# Patient Record
Sex: MALE | Race: WHITE
[De-identification: names, ages, dates, MRNs, and addresses within clinical notes are randomized per-mention and may not be internally consistent; named-entity substitution may affect disease eponyms.]

---

## 2018-03-21 ENCOUNTER — HOSPITAL ENCOUNTER (OUTPATIENT)
Dept: HOSPITAL 57 - BURULT | Age: 63
Discharge: HOME | End: 2018-03-21
Payer: COMMERCIAL

## 2018-03-21 DIAGNOSIS — I25.10: Primary | ICD-10-CM

## 2018-03-21 DIAGNOSIS — R60.0: ICD-10-CM

## 2018-03-21 PROCEDURE — 93970 EXTREMITY STUDY: CPT

## 2018-03-22 NOTE — ULT
BILATERAL LOWER EXTREMITY VENOUS ULTRASOUND

3/21/18

 

Color duplex doppler ultrasonography of the deep veins of each lower extremity was performed. There w
as no evidence of echogenic clot on either side. All deep veins were freely compressible from groin t
o ankle bilaterally. There were normal doppler responses to augmentation maneuvers. 

 

IMPRESSION: 

No evidence of DVT.

 

POS: HOME

## 2018-05-01 ENCOUNTER — HOSPITAL ENCOUNTER (OUTPATIENT)
Dept: HOSPITAL 92 - SCSMRI | Age: 63
Discharge: HOME | End: 2018-05-01
Attending: PSYCHIATRY & NEUROLOGY
Payer: COMMERCIAL

## 2018-05-01 DIAGNOSIS — M47.892: ICD-10-CM

## 2018-05-01 DIAGNOSIS — G56.03: Primary | ICD-10-CM

## 2018-05-01 PROCEDURE — 72141 MRI NECK SPINE W/O DYE: CPT

## 2018-05-01 NOTE — MRI
MRI OF CERVICAL SPINE WITHOUT CONTRAST

5/1/18

 

COMPARISON:  

4/26/15.

 

HISTORY: 

Chronic neck pain radiating from back to head. 

 

TECHNIQUE:  

Multiplanar and multisequence MRI imaging of the cervical spine is provided without contrast.

 

FINDINGS:  

There is scoliosis of the cervical spine, limiting detailed assessment. The study is also somewhat li
mited by motion artifact. The sagittal STIR imaging demonstrates no focal areas of osseous marrow alivia
ma. There is no significant anterolisthesis or retrolisthesis evident on this exam. 

 

There is moderate degenerative change at the atlantoaxial interspaces, slightly progressed since the 
prior exam.

 

C2-3: There is disc desiccation with bilateral facet and uncovertebral osteophyte formation, right gr
eater than left. No significant central canal stenosis. Mild bilateral neural foraminal stenosis. 

 

C3-4: Disc desiccation present with bilateral facet and uncovertebral osteophyte formation, left grea
ter than right. Mild central canal stenosis and right neural foraminal stenosis. Moderate left neural
 foraminal stenosis.

 

C4-5: There is disc desiccation and mild disc space narrowing. There is bilateral facet and uncoverte
bral osteophyte formation. There is no significant central canal stenosis. There is mild bilateral ne
ural foraminal stenosis. 

 

C5-6: There is disc space narrowing, disc desiccation, anterior osteophyte formation and disc osteoph
yte complex partially effacing the ventral thecal and leading to a mild degree of central canal steno
sis. There is bilateral facet and uncovertebral osteophyte formation with moderate right and severe l
eft neural foraminal stenosis. 

 

C6-7: Disc space narrowing, disc desiccation, anterior osteophyte formation and disc osteophyte compl
ex present. There is effacement of the ventral thecal sac with mild central canal stenosis. There is 
mild right and severe left neural foraminal stenosis.

 

C7-T1: Disc space narrowing and disc desiccation with bilateral facet hypertrophy. Mild bilateral bobbi
ral foraminal stenosis. No significant central canal stenosis. There is no focal area of abnormal sig
nal intensity identified within the cervical cord. Detailed assessment on axial imaging is limited by
 persistent patient motion artifact. 

 

IMPRESSION:  

Multilevel degenerative change within the cervical spine as detailed above. 

 

POS: [facility]

## 2018-06-09 ENCOUNTER — HOSPITAL ENCOUNTER (INPATIENT)
Dept: HOSPITAL 92 - ERS | Age: 63
LOS: 5 days | Discharge: HOME | DRG: 871 | End: 2018-06-14
Attending: INTERNAL MEDICINE | Admitting: INTERNAL MEDICINE
Payer: COMMERCIAL

## 2018-06-09 VITALS — BODY MASS INDEX: 37.5 KG/M2

## 2018-06-09 DIAGNOSIS — K80.20: ICD-10-CM

## 2018-06-09 DIAGNOSIS — A41.51: Primary | ICD-10-CM

## 2018-06-09 DIAGNOSIS — N18.9: ICD-10-CM

## 2018-06-09 DIAGNOSIS — E66.01: ICD-10-CM

## 2018-06-09 DIAGNOSIS — N12: ICD-10-CM

## 2018-06-09 DIAGNOSIS — G47.33: ICD-10-CM

## 2018-06-09 DIAGNOSIS — F43.10: ICD-10-CM

## 2018-06-09 DIAGNOSIS — I12.9: ICD-10-CM

## 2018-06-09 DIAGNOSIS — Z87.11: ICD-10-CM

## 2018-06-09 DIAGNOSIS — Z87.891: ICD-10-CM
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
## 2018-06-09 DIAGNOSIS — E27.40: ICD-10-CM

## 2018-06-09 DIAGNOSIS — F32.9: ICD-10-CM

## 2018-06-09 DIAGNOSIS — R65.21: ICD-10-CM

## 2018-06-09 DIAGNOSIS — Z96.651: ICD-10-CM

## 2018-06-09 DIAGNOSIS — N17.9: ICD-10-CM

## 2018-06-09 DIAGNOSIS — B96.20: ICD-10-CM
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
## 2018-06-09 DIAGNOSIS — J40: ICD-10-CM

## 2018-06-09 DIAGNOSIS — Z87.19: ICD-10-CM

## 2018-06-09 DIAGNOSIS — E86.0: ICD-10-CM

## 2018-06-09 DIAGNOSIS — R31.9: ICD-10-CM

## 2018-06-09 LAB
ALBUMIN SERPL BCG-MCNC: 3.5 G/DL (ref 3.4–4.8)
ALP SERPL-CCNC: 220 U/L (ref 40–150)
ALT SERPL W P-5'-P-CCNC: 30 U/L (ref 8–55)
ANION GAP SERPL CALC-SCNC: 18 MMOL/L (ref 10–20)
AST SERPL-CCNC: 73 U/L (ref 5–34)
BACTERIA UR QL AUTO: (no result) HPF
BILIRUB SERPL-MCNC: 1.9 MG/DL (ref 0.2–1.2)
BUN SERPL-MCNC: 42 MG/DL (ref 8.4–25.7)
CALCIUM SERPL-MCNC: 8.8 MG/DL (ref 7.8–10.44)
CHLORIDE SERPL-SCNC: 102 MMOL/L (ref 98–107)
CO2 SERPL-SCNC: 18 MMOL/L (ref 23–31)
CREAT CL PREDICTED SERPL C-G-VRATE: 0 ML/MIN (ref 70–130)
CRYSTAL-AUWI FLAG: 0 (ref 0–15)
GLOBULIN SER CALC-MCNC: 2.6 G/DL (ref 2.4–3.5)
GLUCOSE SERPL-MCNC: 111 MG/DL (ref 80–115)
HEV IGM SER QL: 0.8 (ref 0–7.99)
HGB BLD-MCNC: 12.4 G/DL (ref 14–18)
HYALINE CASTS #/AREA URNS LPF: (no result) LPF
LIPASE SERPL-CCNC: 52 U/L (ref 8–78)
MCH RBC QN AUTO: 30 PG (ref 27–31)
MCV RBC AUTO: 88 FL (ref 80–94)
MDIFF COMPLETE?: YES
PATHC CAST-AUWI FLAG: 0.14 (ref 0–2.49)
PLATELET # BLD AUTO: 154 THOU/UL (ref 130–400)
PLATELET BLD QL SMEAR: (no result)
POLYCHROMASIA BLD QL SMEAR: (no result) (100X)
POTASSIUM SERPL-SCNC: 3.6 MMOL/L (ref 3.5–5.1)
PROT UR STRIP.AUTO-MCNC: 100 MG/DL
RBC # BLD AUTO: 4.14 MILL/UL (ref 4.7–6.1)
RBC UR QL AUTO: (no result) HPF (ref 0–3)
REFLEX FOR REVIEW??: YES
SODIUM SERPL-SCNC: 134 MMOL/L (ref 136–145)
SP GR UR STRIP: 1.01 (ref 1–1.04)
SPERM-AUWI FLAG: 0 (ref 0–9.9)
WBC # BLD AUTO: 6.6 THOU/UL (ref 4.8–10.8)
YEAST-AUWI FLAG: 0 (ref 0–25)

## 2018-06-09 PROCEDURE — 85007 BL SMEAR W/DIFF WBC COUNT: CPT

## 2018-06-09 PROCEDURE — 76700 US EXAM ABDOM COMPLETE: CPT

## 2018-06-09 PROCEDURE — 74018 RADEX ABDOMEN 1 VIEW: CPT

## 2018-06-09 PROCEDURE — 87186 SC STD MICRODIL/AGAR DIL: CPT

## 2018-06-09 PROCEDURE — P9045 ALBUMIN (HUMAN), 5%, 250 ML: HCPCS

## 2018-06-09 PROCEDURE — 94640 AIRWAY INHALATION TREATMENT: CPT

## 2018-06-09 PROCEDURE — 96365 THER/PROPH/DIAG IV INF INIT: CPT

## 2018-06-09 PROCEDURE — 96367 TX/PROPH/DG ADDL SEQ IV INF: CPT

## 2018-06-09 PROCEDURE — 87040 BLOOD CULTURE FOR BACTERIA: CPT

## 2018-06-09 PROCEDURE — 80048 BASIC METABOLIC PNL TOTAL CA: CPT

## 2018-06-09 PROCEDURE — 94760 N-INVAS EAR/PLS OXIMETRY 1: CPT

## 2018-06-09 PROCEDURE — 87149 DNA/RNA DIRECT PROBE: CPT

## 2018-06-09 PROCEDURE — A9537 TC99M MEBROFENIN: HCPCS

## 2018-06-09 PROCEDURE — 87086 URINE CULTURE/COLONY COUNT: CPT

## 2018-06-09 PROCEDURE — 81015 MICROSCOPIC EXAM OF URINE: CPT

## 2018-06-09 PROCEDURE — 85027 COMPLETE CBC AUTOMATED: CPT

## 2018-06-09 PROCEDURE — 83605 ASSAY OF LACTIC ACID: CPT

## 2018-06-09 PROCEDURE — 80053 COMPREHEN METABOLIC PANEL: CPT

## 2018-06-09 PROCEDURE — 85060 BLOOD SMEAR INTERPRETATION: CPT

## 2018-06-09 PROCEDURE — 85025 COMPLETE CBC W/AUTO DIFF WBC: CPT

## 2018-06-09 PROCEDURE — 96360 HYDRATION IV INFUSION INIT: CPT

## 2018-06-09 PROCEDURE — 82533 TOTAL CORTISOL: CPT

## 2018-06-09 PROCEDURE — 80076 HEPATIC FUNCTION PANEL: CPT

## 2018-06-09 PROCEDURE — 81003 URINALYSIS AUTO W/O SCOPE: CPT

## 2018-06-09 PROCEDURE — 87077 CULTURE AEROBIC IDENTIFY: CPT

## 2018-06-09 PROCEDURE — 96368 THER/DIAG CONCURRENT INF: CPT

## 2018-06-09 PROCEDURE — 71045 X-RAY EXAM CHEST 1 VIEW: CPT

## 2018-06-09 PROCEDURE — C9113 INJ PANTOPRAZOLE SODIUM, VIA: HCPCS

## 2018-06-09 PROCEDURE — 71046 X-RAY EXAM CHEST 2 VIEWS: CPT

## 2018-06-09 PROCEDURE — 36556 INSERT NON-TUNNEL CV CATH: CPT

## 2018-06-09 PROCEDURE — 36415 COLL VENOUS BLD VENIPUNCTURE: CPT

## 2018-06-09 PROCEDURE — 96361 HYDRATE IV INFUSION ADD-ON: CPT

## 2018-06-09 PROCEDURE — 78227 HEPATOBIL SYST IMAGE W/DRUG: CPT

## 2018-06-09 PROCEDURE — 96366 THER/PROPH/DIAG IV INF ADDON: CPT

## 2018-06-09 PROCEDURE — 83690 ASSAY OF LIPASE: CPT

## 2018-06-09 PROCEDURE — A4216 STERILE WATER/SALINE, 10 ML: HCPCS

## 2018-06-09 NOTE — HP
PRIMARY CARE PHYSICIAN:  Dr. Gela Renteria in [place].

 

CHIEF COMPLAINT:  Nausea, vomiting, diarrhea, and feeling weak.

 

HISTORY OF PRESENT ILLNESS:  Mr. Madison is a pleasant 62-year-old gentleman who has a history of h
ypertension and peptic ulcer disease as well as posttraumatic stress disorder.  He was in his usual s
torres of health until this past Wednesday.  He says that he got overheated and started feeling very we
ak and the muscles were hurting him really bad, then he started vomiting and having diarrhea.  He say
s he tried to take it easy and drink as much fluids as he could, but he could not hold anything down 
including his medications, then by Friday it started getting worse.  He trying to drink water as well
 as Dr. Pepper thinking it might help, but he still got very weak and was having no energy.  During t
his time, he continued to vomit at least 3-4 times a day as well as have diarrhea, which he says is m
ore like a soft stool at least 3-4 times a day as well.  He did not notice any blood in the stool, bu
t on Wednesday, he said he noticed a lot of blood in his urine.  He says it was so much that it was e
bonnie getting on the toilet seat.  He also noted some burning with urination.  He always has back pain 
due to chronic back pain, so that had not changed.  He did notice some fever and chills on Wednesdays
 and Thursday, as well as on Saturday.  He does have nocturia at least 2-3 times a day and has noted 
a decrease in his urine stream.  Also, regarding the nausea and vomiting, he says he has not eaten an
ything different.  No one else has been sick in the house and prior to this current illness, he was n
ot having any excessive difficulty with eating.

 

REVIEW OF SYSTEMS:  Constitutional:  He has had subjective fever and chills, decreased oral intake an
d decrease in his appetite.  HEENT:  He has felt dizzy and some lightheadedness, but no visual change
s, no sore throat, rhinorrhea, neck pain, no adenopathy.  Pulmonary:  No hemoptysis, no cough, no whe
ezing.  Cardiovascular:  He denies any chest pain or shortness of breath, no PND, no orthopnea.  Cora
rointestinal:  As the history of present illness.  Genitourinary:  As the history of present illness.
  Musculoskeletal:  He does complain of some muscle aches and feeling weak earlier in the week.  Neur
ologic:  No focal weakness or numbness, no seizures.  Psychiatric:  No symptoms of anxiety or depress
ion.  Skin and Integument:  No skin changes.  No rash.

 

PAST MEDICAL HISTORY:  Significant for peptic ulcer disease and significant GI bleed secondary to colleen
t requiring transfusion.  Hypertension and posttraumatic stress disorder.

 

PAST SURGICAL HISTORY:  He has had spinal surgery, septic arthritis of the right knee.  He has also h
ad a total knee replacement on the right.  Multiple back surgeries as well as hip surgery.

 

SOCIAL HISTORY:  He is a former smoker.  He quit about 15 years ago.  He denies any alcohol use.  He 
is  and he is retired.

 

ALLERGIES:  PENICILLIN.

 

FAMILY HISTORY:  History of heart disease and cancer.

 

CURRENT MEDICATIONS:  Include fenofibrate 145 mg daily, lisinopril/hydrochlorothiazide 20/25 one tabl
et twice a day, metoprolol 50 mg daily, Protonix 40 mg a day, Viagra 100 mg daily, simvastatin 20 mg 
daily, Seroquel 50 mg daily, amlodipine 5 mg daily, Meloxicam 15 mg at bedtime.

 

PHYSICAL EXAMINATION:

VITAL SIGNS:  His initial blood pressure was 85/50, heart rate was 106, respiratory rate of 24, tempe
rature is 99.4.

GENERAL:  He is a well-developed and well-nourished.  He appears to be in some distress and he is unc
omfortable, but he says this is because of back pain which is chronic.

HEENT:  His pupils are equal, round, and reactive.  Extraocular muscles are intact.  Sclerae are anic
teric.  Throat:  There is no erythema, no exudates.

NECK:  No adenopathy, no bruits.

LUNGS:  Clear to auscultation.  There is no wheezing or rales.

CARDIOVASCULAR:  He has a normal S1, S2.  There is no S3 or S4.  No murmurs, clicks or rubs.

ABDOMEN:  Distended, it is obese, it is nontender.  There is no rebound or guarding.  Positive bowel 
sounds.

EXTREMITIES:  He has got trace pedal edema.  He has got palpable dorsalis pedis pulses.

NEUROLOGICALLY:  The exam is nonfocal.

 

LABORATORY DATA:  White blood cell count 6.6, hemoglobin 12.4, hematocrit is 36.5, platelet count was
 154.  He had 41 bands.  Sodium 134, potassium 3.6, chloride is 102, CO2 is 18, BUN of 42, creatinine
 2.4 and glucose is 111.  Lactic acid is 4.4.  His total bilirubin was 1.9, alkaline phosphatase was 
220.  He had an abdominal x-ray which was primarily for line placement.  Chest x-ray was essentially 
negative.

 

ASSESSMENT AND PLAN:  This is a pleasant 62-year-old gentleman who presents to the emergency room wit
h nausea, vomiting, and diarrhea, which is going on for several days.  He was hypotensive on presenta
tion and has a bandemia and low-grade temperature.  There are no obvious signs of infection; however,
 the patient does admit to some hematuria and the urinalysis has only just now been sent.  The urine 
color was tannish; however, it was clear.  It is also noted that he has an elevated total bilirubin a
s well as alkaline phosphatase, and he still has his gallbladder, therefore gallbladder pathology is 
a possibility in addition to potentially a urine source.  He also has an acute renal failure likely a
s a result of a combination of volume depletion as well as sepsis.  He will be placed in the ICU, sta
rted on fluid resuscitation, he is already had 4 liters in the emergency room.  This will be continue
d.  He will be placed on antibiotics to cover primarily a GI or urine source.  Urinalysis and urine c
ulture will be sent.  Given the acute renal failure, we will also get a renal ultrasound and we will 
get an abdominal ultrasound given the elevated alkaline phosphatase.  If he has a urinary tract infec
tion and his symptoms improve with treatment with IV antibiotics, then we will forego getting a GI co
nsult; however, if his nausea and vomiting persist, then a GI consultation will be entertained.  Give
n his history of peptic ulcer disease, we will place him on IV Protonix twice a day as well as IV ant
iemetics until his acute symptomatology can be sorted out.

## 2018-06-09 NOTE — RAD
CHEST 1 VIEW:

 

HISTORY: 

Nausea.  Abdominal and chest pain.

 

COMPARISON: 

9/2/17.

 

FINDINGS: 

Cardiac silhouette magnified by projection.  Pulmonary vasculature upper limits of normal.  Mediastin
um is midline.  Right hemidiaphragm remains lobulated.  No lobar consolidation or evidence of pneumot
horax.  Cardiac monitor leads overlie the chest.

 

IMPRESSION: 

Borderline pulmonary vascular congestion.

 

POS: [facility]

## 2018-06-09 NOTE — RAD
CHEST ONE VIEW:

6/9/18

 

HISTORY: 

Central line placement.

 

COMPARISON:  

Chest radiograph same day. 

 

FINDINGS:  

No central venous catheter is seen on this examination. Heart size is mildly enlarged. Mild worsening
 pulmonary venous congestion.

 

IMPRESSION:  

Mild worsening pulmonary venous congestion. No central venous catheter is seen. 

 

POS: [facility]

## 2018-06-09 NOTE — RAD
ABDOMEN ONE VIEW

6/9/18

 

HISTORY: 

Central line verification. 

 

COMPARISON:  

None.

 

FINDINGS:  

A central venous catheter is in place in the left femoral areas with the tip not well see, quite poss
ibly due to the SI joint fusion hardware. Lumbosacral fusion hardware is also present. 

 

IMPRESSION:  

Left femoral catheter tip not well seen. Followup recommended. 

 

POS: [facility]

## 2018-06-10 LAB
ALBUMIN SERPL BCG-MCNC: 3.5 G/DL (ref 3.4–4.8)
ALP SERPL-CCNC: 82 U/L (ref 40–150)
ALT SERPL W P-5'-P-CCNC: 51 U/L (ref 8–55)
ANION GAP SERPL CALC-SCNC: 15 MMOL/L (ref 10–20)
AST SERPL-CCNC: 110 U/L (ref 5–34)
BILIRUB DIRECT SERPL-MCNC: 1.2 MG/DL (ref 0.1–0.3)
BILIRUB SERPL-MCNC: 1.7 MG/DL (ref 0.2–1.2)
BUN SERPL-MCNC: 42 MG/DL (ref 8.4–25.7)
CALCIUM SERPL-MCNC: 8.5 MG/DL (ref 7.8–10.44)
CHLORIDE SERPL-SCNC: 102 MMOL/L (ref 98–107)
CO2 SERPL-SCNC: 20 MMOL/L (ref 23–31)
CREAT CL PREDICTED SERPL C-G-VRATE: 57 ML/MIN (ref 70–130)
GLUCOSE SERPL-MCNC: 103 MG/DL (ref 80–115)
HGB BLD-MCNC: 12 G/DL (ref 14–18)
MCH RBC QN AUTO: 29.9 PG (ref 27–31)
MCV RBC AUTO: 88.8 FL (ref 80–94)
MDIFF COMPLETE?: YES
PLATELET # BLD AUTO: 155 THOU/UL (ref 130–400)
PLATELET BLD QL SMEAR: (no result)
POTASSIUM SERPL-SCNC: 4.3 MMOL/L (ref 3.5–5.1)
RBC # BLD AUTO: 4.01 MILL/UL (ref 4.7–6.1)
SODIUM SERPL-SCNC: 133 MMOL/L (ref 136–145)
TOXIC GRANULES BLD QL SMEAR: SLIGHT
WBC # BLD AUTO: 20.9 THOU/UL (ref 4.8–10.8)

## 2018-06-10 PROCEDURE — 3E033XZ INTRODUCTION OF VASOPRESSOR INTO PERIPHERAL VEIN, PERCUTANEOUS APPROACH: ICD-10-PCS | Performed by: INTERNAL MEDICINE

## 2018-06-10 RX ADMIN — VASOPRESSIN SCH MLS: 20 INJECTION INTRAVENOUS at 01:27

## 2018-06-10 RX ADMIN — HYDROCORTISONE SODIUM SUCCINATE SCH MG: 100 INJECTION, POWDER, FOR SOLUTION INTRAMUSCULAR; INTRAVENOUS at 23:03

## 2018-06-10 RX ADMIN — HYDROCORTISONE SODIUM SUCCINATE SCH MG: 100 INJECTION, POWDER, FOR SOLUTION INTRAMUSCULAR; INTRAVENOUS at 11:43

## 2018-06-10 RX ADMIN — MEROPENEM AND SODIUM CHLORIDE SCH MLS: 1 INJECTION, SOLUTION INTRAVENOUS at 15:10

## 2018-06-10 RX ADMIN — MEROPENEM AND SODIUM CHLORIDE SCH MLS: 1 INJECTION, SOLUTION INTRAVENOUS at 23:06

## 2018-06-10 RX ADMIN — HYDROCORTISONE SODIUM SUCCINATE SCH MG: 100 INJECTION, POWDER, FOR SOLUTION INTRAMUSCULAR; INTRAVENOUS at 17:52

## 2018-06-10 RX ADMIN — Medication SCH ML: at 19:53

## 2018-06-10 RX ADMIN — Medication SCH MLS: at 09:23

## 2018-06-10 RX ADMIN — Medication SCH MLS: at 04:56

## 2018-06-10 RX ADMIN — Medication SCH MLS: at 00:28

## 2018-06-10 RX ADMIN — VASOPRESSIN SCH MLS: 20 INJECTION INTRAVENOUS at 17:53

## 2018-06-10 NOTE — CON
DATE OF CONSULTATION:  06/10/2018

 

HISTORY OF PRESENT ILLNESS:  José Luis Madison is a 62-year-old obese gentleman from [place], [place], 
who weighs 113 kilograms, presented to the ER on 06/09/2018 with hypotension, blood pressure 85/55, t
emperature 99, respiration 24 with a several day history of intermittent nausea, vomiting, diarrhea, 
back pain.  He said he became overheated on Wednesday, 3 days ago, profuse diarrhea, he has had blood
 in the urine.  Day before coming to the hospital, he did have severe back pain, weakness, dizziness,
 lightheaded for the hematuria and hypertension.

 

He normally sees a doctor in [place] and Dr. Renteria.

 

Since admission, he was started on 2 pressors, Levophed and vasopressin, hydrated.

 

He is feeling better this morning.  He is awake, alert, and responsive.

 

PAST MEDICAL HISTORY:  Pertinent for chronic pain; history of sleep apnea, though he has never been t
ested; history of hypertension; history of anxiety; history of traumatic stress syndrome apparently; 
former smoker, a pack a day for 30 years.

 

PAST SURGICAL HISTORY:  Otherwise included a T12-S1 lumbar fusion, rectal surgery, bilateral hip surg
sj, right knee surgery.

 

SOCIAL HISTORY:  Tobacco as noted, a pack a day for about 15 years, quit smoking 15 years ago.

 

LIST OF MEDICATIONS:  From home includes Tylenol No. 3, vitamins, Effexor 150 twice a day, Ranexa 500
 twice a day, lisinopril twice a day, Viagra 1 tablet as needed, Seroquel 50, Protonix 40, metoprolol
, Keppra 50, Zocor 20, meloxicam 15, Norvasc 5.

 

He is started on Levaquin antibiotic and pressors.

 

ALLERGIES:  PENICILLIN.

 

SOCIAL/FAMILY HISTORY:  Otherwise unremarkable.

 

REVIEW OF SYSTEMS:  Ten point negative.

 

PHYSICAL EXAMINATION:

VITAL SIGNS:  Blood pressure is 101/80, pulse rate is 80.

GENERAL:  Awake, alert, responsive.

CHEST:  Reveals no crackles, rubs or wheezing.

CARDIAC:  Normal S1, S2, no gallops.

ABDOMEN:  Soft.  No masses.

 

LABORATORY:  White count 20,000, H and H 12 and 25, platelet count 55.  He has got 41 bands.  Creatin
ine is 2.4, BUN is 42.  Lactic acid 4.6.  Urine shows too numerous to count rbcs and wbcs.  Ultrasoun
d of the abdomen shows gallbladder distention and thickening, possibly cholecystitis.

 

IMPRESSION:  Hypotension, sepsis, urinary tract infection, morbid obesity, sleep apnea, renal failure
, hypertension, chronic arthritis, depression.

 

PLAN:  A consult has been ordered.  Continue aggressive hydration.

 

I have added steroids and Rocephin for broader coverage.

 

We will follow.  Forty-five minutes critical care time.

## 2018-06-10 NOTE — ULT
SONOGRAM ABDOMEN COMPLETE:

 

HISTORY: 

Upper abdomen pain.  Abnormal liver function tests.

 

FINDINGS: 

Gallbladder discussed up to 9.0 cm.  No shadowing stones are apparent.  Gallbladder wall is somewhat 
thickened at 0.6 cm with minimal pericholecystic fluid.  

 

Liver is heterogeneous without intrahepatic biliary dilatation evident.  Common duct 0.6 cm.  No sign
ificant free fluid.  Small right renal cyst.  No hydronephrosis.  Tiny echogenic focus at the superio
r pole left kidney is nonspecific.  Urinary bladder decompressed.  Spleen unremarkable.  The aorta, I
VC, and pancreas are predominantly obscured.

 

IMPRESSION: 

Gallbladder distention and wall thickening can be seen with acute cholecystitis, although these are n
onspecific findings.  Clinical correlation regarding other signs and symptoms of acute cholecystitis 
is required.  No evidence of central biliary obstruction.  No focal hepatic abnormalities are apparen
t.

 

POS: [facility]

## 2018-06-10 NOTE — PDOC.PN
- Subjective


Encounter Start Date: 06/10/18


Encounter Start Time: 10:30


Subjective: pt up in bed eating





- Objective


Resuscitation Status: 


 











Resuscitation Status           FULL:Full Resuscitation














Vital Signs & Weight: 


 Vital Signs (12 hours)











  Temp Pulse Resp Pulse Ox


 


 06/10/18 12:00  98.1 F   


 


 06/10/18 08:52     98


 


 06/10/18 08:00  97.9 F  102 H  21 H  100








 Weight











Weight                         262 lb 2.074 oz











 Most Recent Monitor Data











Heart Rate from ECG            80


 


NIBP                           113/74


 


NIBP BP-Mean                   86


 


Respiration from ECG           24


 


SpO2                           100














I&O: 


 











 06/09/18 06/10/18 06/11/18





 06:59 06:59 06:59


 


Intake Total  1339.5 1080


 


Output Total  3675 1930


 


Balance  -2335.5 -850











Result Diagrams: 


 06/10/18 04:45





 06/10/18 04:45





Phys Exam





- Physical Examination


HEENT: PERRLA, moist MMs, sclera anicteric, TM's clear, oral pharynx no lesions

, 2+ tonsils


Neck: no nodes, no JVD, supple, full ROM


Respiratory: no wheezing, no rales, no rhonchi, wheezing present, clear to 

auscultation bilateral


Cardiovascular: RRR, no significant murmur, no rub, gallop, irregular


Gastrointestinal: soft, non-tender, no distention, positive bowel sounds


Musculoskeletal: no edema, pulses present, edema present





Dx/Plan


(1) Sepsis


Code(s): A41.9 - SEPSIS, UNSPECIFIED ORGANISM   Status: Acute   





(2) UTI (urinary tract infection)


Status: Acute   





(3) Leukocytosis


Code(s): D72.829 - ELEVATED WHITE BLOOD CELL COUNT, UNSPECIFIED   Status: Acute

   





(4) Acute kidney injury


Code(s): N17.9 - ACUTE KIDNEY FAILURE, UNSPECIFIED   Status: Acute   Comment: 

resolving   





- Plan





* .


pt is still on 2 pressors but are being weaned down. will change abx to 

meropenam since he is still on pressors but clinically appears well. His RUQ 

ultrasound indicated gall bladder enlargement and thickness. may get HIDA scan. 





Review of Systems





- Review of Systems


ENT: negative: Ear Pain, Ear Discharge, Nose Pain, Nose Discharge, Nose 

Congestion, Mouth Pain, Mouth Swelling, Throat Pain, Throat Swelling, Other


Respiratory: negative: Cough, Dry, Shortness of Breath, Hemoptysis, SOB with 

Excertion, Pleuritic Pain, Sputum, Wheezing


Cardiovascular: negative: chest pain, palpitations, orthopnea, paroxysmal 

nocturnal dyspnea, edema, light headedness, other


Gastrointestinal: negative: Nausea, Vomiting, Abdominal Pain, Diarrhea, 

Constipation, Melena, Hematochezia, Other


Genitourinary: negative: Dysuria, Frequency, Incontinence, Hematuria, Retention

, Other





- Medications/Allergies


Allergies/Adverse Reactions: 


 Allergies











Allergy/AdvReac Type Severity Reaction Status Date / Time


 


Penicillins Allergy Severe chest Verified 09/02/17 18:49





   cramps,  





   "massive"  











Medications: 


 Current Medications





Acetaminophen (Tylenol)  650 mg PO Q4H PRN


   PRN Reason: Headache/Fever or Pain


   Last Admin: 06/10/18 14:17 Dose:  650 mg


Acetaminophen/Codeine Phosphate (Tylenol #3)  1 tab PO TID PRN


   PRN Reason: Pain


Albuterol/Ipratropium (Duoneb)  3 ml NEB Q4H PRN


   PRN Reason: .SOB


   Last Admin: 06/09/18 20:16 Dose:  3 ml


Atorvastatin Calcium (Lipitor)  10 mg PO HS [facility]


   Last Admin: 06/09/18 22:30 Dose:  10 mg


Fenofibrate (Tricor)  145 mg PO QPM [facility]


   Last Admin: 06/09/18 22:29 Dose:  145 mg


Ferrous Sulfate (Feosol)  325 mg PO HS [facility]


   Last Admin: 06/09/18 22:30 Dose:  325 mg


Hydrocortisone Sodium Succinate (Solu-Cortef)  50 mg IVP Q6HR [facility]


   Stop: 06/17/18 12:01


   Last Admin: 06/10/18 11:43 Dose:  50 mg


Norepinephrine Bitartrate (Levophed)  250 mls @ 0 mls/hr IVPB INF [facility]; Titrate


   PRN Reason: Protocol


   Last Admin: 06/10/18 09:23 Dose:  250 mls


Sodium Chloride (Normal Saline 0.9%)  1,000 mls @ 50 mls/hr IV .Q20H [facility]


   Last Admin: 06/10/18 14:17 Dose:  1,000 mls


Vasopressin 40 unit/Miscellaneous Medication 1 each/ Sodium Chloride  102 mls @ 

0 mls/hr IV INF [facility]SEBIO; As Directed


   PRN Reason: Protocol


   Last Admin: 06/10/18 01:27 Dose:  102 mls


Meropenem 1 gm/ Device  50 mls @ 100 mls/hr IVPB Q8H [facility]


   Last Admin: 06/10/18 15:10 Dose:  50 mls


Ondansetron HCl (Zofran Odt)  4 mg PO Q6H PRN


   PRN Reason: Nausea/Vomiting


   Last Admin: 06/09/18 20:17 Dose:  4 mg


Ondansetron HCl (Zofran)  4 mg IVP Q6H PRN


   PRN Reason: Nausea/Vomiting


Pantoprazole Sodium (Protonix)  40 mg IVP Q12HR [facility]


   Last Admin: 06/10/18 08:05 Dose:  Not Given


Quetiapine Fumarate (Seroquel)  50 mg PO HS [facility]


   Last Admin: 06/09/18 22:31 Dose:  50 mg


Ranolazine (Ranexa)  500 mg PO BID [facility]


   Last Admin: 06/10/18 08:04 Dose:  500 mg


Sodium Chloride (Flush - Normal Saline)  10 ml IVF Q12HR [facility]


Sodium Chloride (Flush - Normal Saline)  10 ml IVF PRN PRN


   PRN Reason: Saline Flush


Tramadol HCl (Ultram)  50 mg PO Q4H PRN


   PRN Reason: Moderate Pain (4-6)


   Last Admin: 06/10/18 14:17 Dose:  50 mg


Venlafaxine HCl (Effexor Xr)  150 mg PO 0600,1200 [facility]


   Last Admin: 06/10/18 11:44 Dose:  150 mg

## 2018-06-11 RX ADMIN — HYDROCORTISONE SODIUM SUCCINATE SCH MG: 100 INJECTION, POWDER, FOR SOLUTION INTRAMUSCULAR; INTRAVENOUS at 20:47

## 2018-06-11 RX ADMIN — HYDROCORTISONE SODIUM SUCCINATE SCH MG: 100 INJECTION, POWDER, FOR SOLUTION INTRAMUSCULAR; INTRAVENOUS at 17:42

## 2018-06-11 RX ADMIN — Medication SCH ML: at 08:52

## 2018-06-11 RX ADMIN — HYDROCORTISONE SODIUM SUCCINATE SCH MG: 100 INJECTION, POWDER, FOR SOLUTION INTRAMUSCULAR; INTRAVENOUS at 13:03

## 2018-06-11 RX ADMIN — MEROPENEM AND SODIUM CHLORIDE SCH MLS: 1 INJECTION, SOLUTION INTRAVENOUS at 05:55

## 2018-06-11 RX ADMIN — CEFTRIAXONE SCH MLS: 2 INJECTION, POWDER, FOR SOLUTION INTRAMUSCULAR; INTRAVENOUS at 09:30

## 2018-06-11 RX ADMIN — HYDROCORTISONE SODIUM SUCCINATE SCH MG: 100 INJECTION, POWDER, FOR SOLUTION INTRAMUSCULAR; INTRAVENOUS at 05:04

## 2018-06-11 RX ADMIN — Medication SCH ML: at 20:48

## 2018-06-11 NOTE — PDOC.PN
- Subjective


Encounter Start Date: 06/11/18


Encounter Start Time: 14:00


Subjective: pt up in bed no complains





- Objective


Resuscitation Status: 


 











Resuscitation Status           FULL:Full Resuscitation














Vital Signs & Weight: 


 Vital Signs (12 hours)











  Temp Pulse Resp Pulse Ox


 


 06/11/18 16:00  98.5 F   


 


 06/11/18 10:13     97


 


 06/11/18 07:08  98.5 F  73  22 H  99








 Weight











Admit Weight                   262 lb


 


Weight                         262 lb 3.2 oz











 Most Recent Monitor Data











Heart Rate from ECG            70


 


NIBP                           84/58


 


NIBP BP-Mean                   61


 


Respiration from ECG           12


 


SpO2                           97














I&O: 


 











 06/10/18 06/11/18 06/12/18





 06:59 06:59 06:59


 


Intake Total 1339.5 3464 932


 


Output Total 3675 3415 1555


 


Balance -2335.5 49 -623











Result Diagrams: 


 06/10/18 04:45





 06/10/18 04:45





Phys Exam





- Physical Examination


HEENT: PERRLA, moist MMs, sclera anicteric, TM's clear, oral pharynx no lesions

, 2+ tonsils


Neck: no nodes, no JVD, supple, full ROM


Respiratory: no wheezing, no rales, no rhonchi, wheezing present, clear to 

auscultation bilateral


Cardiovascular: RRR, no significant murmur, no rub, gallop, irregular


Gastrointestinal: soft, non-tender, no distention, positive bowel sounds


Musculoskeletal: no edema, pulses present, edema present





Dx/Plan


(1) Sepsis


Code(s): A41.9 - SEPSIS, UNSPECIFIED ORGANISM   Status: Acute   





(2) UTI (urinary tract infection)


Status: Acute   





(3) Leukocytosis


Code(s): D72.829 - ELEVATED WHITE BLOOD CELL COUNT, UNSPECIFIED   Status: Acute

   





(4) Acute kidney injury


Code(s): N17.9 - ACUTE KIDNEY FAILURE, UNSPECIFIED   Status: Acute   Comment: 

resolving   





- Plan





* .


pt is off pressors  but he is on stress dose of steroids. His cx indicated 

ecoli sensitive to ecoli but pt bp is still low after adequate fluid 

resuscitation.  hida scan ordered indicated chronic cholecystitis will get 

surgery to see pt. Would tirtrate steroids or stop them since there is no 

indication for them.  





Review of Systems





- Review of Systems


ENT: negative: Ear Pain, Ear Discharge, Nose Pain, Nose Discharge, Nose 

Congestion, Mouth Pain, Mouth Swelling, Throat Pain, Throat Swelling, Other


Respiratory: negative: Cough, Dry, Shortness of Breath, Hemoptysis, SOB with 

Excertion, Pleuritic Pain, Sputum, Wheezing


Cardiovascular: negative: chest pain, palpitations, orthopnea, paroxysmal 

nocturnal dyspnea, edema, light headedness, other


Gastrointestinal: negative: Nausea, Vomiting, Abdominal Pain, Diarrhea, 

Constipation, Melena, Hematochezia, Other





- Medications/Allergies


Allergies/Adverse Reactions: 


 Allergies











Allergy/AdvReac Type Severity Reaction Status Date / Time


 


Penicillins Allergy Severe chest Verified 09/02/17 18:49





   cramps,  





   "massive"  











Medications: 


 Current Medications





Acetaminophen (Tylenol)  650 mg PO Q4H PRN


   PRN Reason: Headache/Fever or Pain


   Last Admin: 06/10/18 14:17 Dose:  650 mg


Acetaminophen/Codeine Phosphate (Tylenol #3)  1 tab PO TID PRN


   PRN Reason: Pain


Albuterol/Ipratropium (Duoneb)  3 ml NEB Q4H PRN


   PRN Reason: .SOB


   Last Admin: 06/09/18 20:16 Dose:  3 ml


Alprazolam (Xanax)  0.5 mg PO HSPRN PRN


   PRN Reason: Anxiety/Insomnia


Atorvastatin Calcium (Lipitor)  10 mg PO HS [facility]


   Last Admin: 06/10/18 19:48 Dose:  10 mg


Diphenhydramine HCl (Benadryl)  25 mg PO HS PRN


   PRN Reason: Insomnia


Ferrous Sulfate (Feosol)  325 mg PO HS [facility]


   Last Admin: 06/10/18 19:47 Dose:  325 mg


Hydrocortisone Sodium Succinate (Solu-Cortef)  50 mg IVP Q6HR [facility]


   Stop: 06/17/18 12:01


   Last Admin: 06/11/18 17:42 Dose:  50 mg


Sodium Chloride (Normal Saline 0.9%)  1,000 mls @ 50 mls/hr IV .Q20H [facility]


   Last Admin: 06/11/18 13:34 Dose:  1,000 mls


Ceftriaxone Sodium 2 gm/ (Sodium Chloride)  100 mls @ 200 mls/hr IVPB DAILY [facility]


   Last Admin: 06/11/18 09:30 Dose:  100 mls


Ondansetron HCl (Zofran Odt)  4 mg PO Q6H PRN


   PRN Reason: Nausea/Vomiting


   Last Admin: 06/09/18 20:17 Dose:  4 mg


Ondansetron HCl (Zofran)  4 mg IVP Q6H PRN


   PRN Reason: Nausea/Vomiting


Pantoprazole Sodium (Protonix)  40 mg IVP Q12HR [facility]


   Last Admin: 06/11/18 08:53 Dose:  40 mg


Quetiapine Fumarate (Seroquel)  50 mg PO HS [facility]


   Last Admin: 06/10/18 19:47 Dose:  50 mg


Ranolazine (Ranexa)  500 mg PO BID [facility]


   Last Admin: 06/11/18 08:57 Dose:  500 mg


Sodium Chloride (Flush - Normal Saline)  10 ml IVF Q12HR [facility]


   Last Admin: 06/11/18 08:52 Dose:  10 ml


Sodium Chloride (Flush - Normal Saline)  10 ml IVF PRN PRN


   PRN Reason: Saline Flush


Tramadol HCl (Ultram)  50 mg PO Q4H PRN


   PRN Reason: Moderate Pain (4-6)


   Last Admin: 06/10/18 19:48 Dose:  50 mg


Venlafaxine HCl (Effexor Xr)  150 mg PO 0600,1200 [facility]


   Last Admin: 06/11/18 13:03 Dose:  150 mg

## 2018-06-11 NOTE — PRG
DATE OF SERVICE:  06/11/2018

 

This morning he is awake, alert, responsive. 

 

PHYSICAL EXAMINATION: 

VITAL SIGNS:  Blood pressure is better 110/80, pulse 80, respiration rate 18.  He is still on some va
sopressin drip, off of the Levophed.  His I's and O's are 3464 in, 3415 out.

CHEST:  No wheezing.

CARDIAC:  Normal S1, S2, no gallops.

ABDOMEN:  Soft, no masses.

 

LABORATORY:  Bilirubin is slightly elevated at 1.7.  The urine is growing E. coli sensitive to the pr
esent antibiotic.

 

IMPRESSION:

1.  Escherichia coli sepsis.

2.  Relative adrenal insufficiency.

3.  Depression.

4.  Sleep apnea.

 

PLAN:  The patient was started on meropenem.  I see no reason.  The patient is sensitive to all the p
resent antibiotics.

 

I would deescalate antibiotics.  PT support and nutrition.

 

I will follow.

 

One-half hour critical care time.

## 2018-06-11 NOTE — NM
HEPATOBILIARY SCAN:

 

Date:  06/11/18 

 

HISTORY:  

Hepatobiliary scan. 

 

HISTORY:  

Elevated LFTs. 

 

RADIOPHARMACEUTICAL: 

4.7 mCi technetium-99m mebrofenin injected intravenously. 

 

CCK-8:

Patient pretreated with an IV infusion of 2.3 mcg CCK-8 over 30 minutes 1/2 hour prior to the exam an
d another similar dose after filling of the gallbladder to evaluate contractility. 

 

FINDINGS:

There is good tracer extraction by the liver with prompt excretion into the biliary tract and small b
owel loops, and normal filling of the gallbladder. Calculated gallbladder ejection fraction following
 CCK-8 administration measures 23%. 

 

IMPRESSION: 

Chronic acalculous cholecystitis/gallbladder dyskinesia. 

 

 

POS: [facility]

## 2018-06-11 NOTE — HP
HISTORY OF PRESENT ILLNESS:  José Luis Madison is a 62-year-old retired  [employer]
shiraz and others present.  He is now retired.  He presents this hospitalization on 06/09/2018.  He admit
josette to Hospitalist Service for acute superimposed on chronic kidney injury, nausea, vomiting, diarrhe
a, and malaise.  He noticed some decrease in urine stream.  He has had fever and chills.  No other il
lnesses in the family noted.  He had a fever, concentrated urine.  On admission, his white count was 
6.6, today at the ER stay is 20.9.  On admission, BUN 42, creatinine 2.45, today 42 and 2.24.  Lactic
 acid on admission 4.4, bilirubin on admission 1.9, today 1.7.  This hospitalization, the patient has
 had urine cultures, positive for E. coli greater than 10 to the fifth.  Blood cultures have been neg
ative today.  On admission, the patient had abdominal x-ray that was nonspecific.  Ultrasound reveale
d a distended gallbladder with possible pericholecystic fluid.  Liver was normal without ductal dilat
ation.  Bile duct was measured at 6 mm.  No stones or sludge were noted.  The patient subsequently to
day underwent hepatobiliary scan revealing ejection fraction of 23%.  There was prompt filling of the
 gallbladder and prompt emptying of the biliary tree.  The patient denies having any discomfort durin
g the hepatobiliary scan.  He denies having any past history of abdominal complaints.  He has been se
en by Gastroenterology and had upper endoscopies revealing intermittent gastritis and antral ulcers. 
 He was seen as an outpatient and had colonoscopies, last of which was 5 years ago and they recall th
at is normal.

 

ALLERGIES:  PENICILLIN.

 

TOBACCO:  None.

 

ALCOHOL:  None.

 

HOME MEDICATIONS:  Tylenol No. 3, 

multivitamins, ferrous sulfate, Effexor, Ranexa, lisinopril, p.r.n. Viagra, Protonix, fenofibrate, Zo
cor, meloxicam, and Norvasc.

 

PAST MEDICAL HISTORY:  Hypertension, elevated cholesterol, history of antral ulcers and gastritis, ha
d two upper endoscopies in the past, hypertension, posttraumatic stress disorder, chronic kidney dise
ase with superimposed acute kidney injury this hospitalization.  Approximately 2 years ago, he had a 
cardiac catheterization that did not have any significant disease.  Last year, 09/2017, he had a nucl
ear stress test that was normal with normal ejection fraction.

 

PHYSICAL EXAMINATION:

VITAL SIGNS:  Height 5 feet 10, 262 pounds, 37 BMI, 117/71, and 77 heart rate.

HEENT:  Unremarkable.  Sclerae nonicteric.

SKIN:  Nonjaundiced.

LUNGS:  Clear to auscultation.

CARDIAC:  Regular rate and rhythm without murmur or gallop.

ABDOMEN:  Soft, nontender, no masses, obese.  No hernias.

EXTREMITIES:  Unremarkable.

 

ASSESSMENT AND PLAN:  Abnormal gallbladder ejection fraction without HIDA scan producing any symptoms
.  No history of biliary disease.  The patient's elevated liver function tests are probably related t
o his urinary tract infection or other causes.  His bile duct is not dilated.  His HIDA scan was norm
al except for a low ejection fraction, but clinically he does not have any biliary complaints.  I wou
ld not recommend any surgery at this time.  I would resume his heart healthy diet and I will see him 
as needed.  He is welcome to see me as an outpatient.  There is no indication for cholecystectomy or 
further biliary workup at this time.  Mild bilirubin elevation may be due to his urinary tract infect
ion and cholestasis secondary to fatty liver, obesity, but certainly cholecystectomy is not indicated
.  He can see me as an outpatient.

## 2018-06-12 LAB
ALBUMIN SERPL BCG-MCNC: 3.5 G/DL (ref 3.4–4.8)
ALP SERPL-CCNC: 93 U/L (ref 40–150)
ALT SERPL W P-5'-P-CCNC: 37 U/L (ref 8–55)
ANION GAP SERPL CALC-SCNC: 13 MMOL/L (ref 10–20)
AST SERPL-CCNC: 61 U/L (ref 5–34)
BILIRUB SERPL-MCNC: 0.8 MG/DL (ref 0.2–1.2)
BUN SERPL-MCNC: 37 MG/DL (ref 8.4–25.7)
CALCIUM SERPL-MCNC: 8.2 MG/DL (ref 7.8–10.44)
CHLORIDE SERPL-SCNC: 108 MMOL/L (ref 98–107)
CO2 SERPL-SCNC: 23 MMOL/L (ref 23–31)
CREAT CL PREDICTED SERPL C-G-VRATE: 84 ML/MIN (ref 70–130)
GLOBULIN SER CALC-MCNC: 2.6 G/DL (ref 2.4–3.5)
GLUCOSE SERPL-MCNC: 136 MG/DL (ref 80–115)
HGB BLD-MCNC: 10.7 G/DL (ref 14–18)
MCH RBC QN AUTO: 30.2 PG (ref 27–31)
MCV RBC AUTO: 88.7 FL (ref 80–94)
MDIFF COMPLETE?: YES
PLATELET # BLD AUTO: 131 THOU/UL (ref 130–400)
POTASSIUM SERPL-SCNC: 3.9 MMOL/L (ref 3.5–5.1)
RBC # BLD AUTO: 3.53 MILL/UL (ref 4.7–6.1)
SODIUM SERPL-SCNC: 140 MMOL/L (ref 136–145)
TOXIC GRANULES BLD QL SMEAR: SLIGHT
WBC # BLD AUTO: 22.8 THOU/UL (ref 4.8–10.8)

## 2018-06-12 RX ADMIN — Medication SCH ML: at 09:17

## 2018-06-12 RX ADMIN — Medication SCH ML: at 20:32

## 2018-06-12 RX ADMIN — HYDROCORTISONE SODIUM SUCCINATE SCH MG: 100 INJECTION, POWDER, FOR SOLUTION INTRAMUSCULAR; INTRAVENOUS at 08:53

## 2018-06-12 RX ADMIN — CEFTRIAXONE SCH MLS: 2 INJECTION, POWDER, FOR SOLUTION INTRAMUSCULAR; INTRAVENOUS at 08:54

## 2018-06-12 RX ADMIN — LISINOPRIL AND HYDROCHLOROTHIAZIDE SCH TAB: 12.5; 2 TABLET ORAL at 20:30

## 2018-06-12 NOTE — PRG
DATE OF SERVICE:  06/12/2018

 

The patient is awake, alert, responsive. 

 

PHYSICAL EXAMINATION: 

VITAL SIGNS:  Blood pressure has resolved, much improved 142/90.  She is off all pressors, respiratio
ns 26, temperature 98, sats 100%.  I's & O's have been good 1510 in, 3710 out.

CHEST:  Chest reveals no wheezing.

CARDIAC:  Normal S1-S2.  No gallops.

ABDOMEN:  Soft.  No masses.

 

LABORATORY:  White count 20,000, H&H 10 and 30, platelet count 131, creatinine 1.52.

 

IMPRESSION:

1.  Escherichia coli sepsis.  

2.  Hypertension, resolved.

3.  Sleep apnea.

4.  Cholelithiasis.

 

PLAN:  The patient can be transferred out of the ICU.  Continue antibiotics.  Outpatient gallbladder 
surgery.  Outpatient sleep study.

## 2018-06-13 LAB
HGB BLD-MCNC: 12.1 G/DL (ref 14–18)
MCH RBC QN AUTO: 30 PG (ref 27–31)
MCV RBC AUTO: 88.2 FL (ref 80–94)
MDIFF COMPLETE?: YES
PLATELET # BLD AUTO: 159 THOU/UL (ref 130–400)
RBC # BLD AUTO: 4.03 MILL/UL (ref 4.7–6.1)
WBC # BLD AUTO: 14.6 THOU/UL (ref 4.8–10.8)

## 2018-06-13 RX ADMIN — LISINOPRIL AND HYDROCHLOROTHIAZIDE SCH TAB: 12.5; 2 TABLET ORAL at 20:53

## 2018-06-13 RX ADMIN — Medication SCH ML: at 09:20

## 2018-06-13 RX ADMIN — LISINOPRIL AND HYDROCHLOROTHIAZIDE SCH TAB: 12.5; 2 TABLET ORAL at 09:19

## 2018-06-13 RX ADMIN — CEFTRIAXONE SCH MLS: 2 INJECTION, POWDER, FOR SOLUTION INTRAMUSCULAR; INTRAVENOUS at 09:18

## 2018-06-13 RX ADMIN — Medication SCH ML: at 20:55

## 2018-06-13 NOTE — PDOC.PN
- Subjective


Encounter Start Date: 06/12/18


Encounter Start Time: 10:00


Subjective: pt up in bed no complains





- Objective


Resuscitation Status: 


 











Resuscitation Status           FULL:Full Resuscitation














Vital Signs & Weight: 


 Vital Signs (12 hours)











  Temp Pulse Resp BP Pulse Ox


 


 06/13/18 04:00  98.1 F  88  20   96


 


 06/13/18 00:00  97.8 F  70  20  146/88 H  93 L


 


 06/12/18 20:15  97.7 F  81  20  146/88 H  100


 


 06/12/18 19:55  97.7 F  81  20  173/125 H  100








 Weight











Admit Weight                   262 lb


 


Weight                         262 lb











 Most Recent Monitor Data











Heart Rate from ECG            85


 


NIBP                           159/92


 


NIBP BP-Mean                   115


 


Respiration from ECG           20


 


SpO2                           100














I&O: 


 











 06/11/18 06/12/18 06/13/18





 06:59 06:59 06:59


 


Intake Total 3464 1510 1399


 


Output Total 3415 3710 665


 


Balance 49 -2200 734











Result Diagrams: 


 06/12/18 03:50





 06/12/18 03:50





Phys Exam





- Physical Examination


HEENT: PERRLA, moist MMs, sclera anicteric, TM's clear, oral pharynx no lesions

, 2+ tonsils


Neck: no nodes, no JVD, supple, full ROM


Respiratory: no wheezing, no rales, no rhonchi, wheezing present, clear to 

auscultation bilateral


Cardiovascular: RRR, no significant murmur, no rub, gallop, irregular


Gastrointestinal: soft, non-tender, no distention, positive bowel sounds





Dx/Plan


(1) Sepsis


Code(s): A41.9 - SEPSIS, UNSPECIFIED ORGANISM   Status: Acute   





(2) UTI (urinary tract infection)


Status: Acute   





(3) Leukocytosis


Code(s): D72.829 - ELEVATED WHITE BLOOD CELL COUNT, UNSPECIFIED   Status: Acute

   





(4) Acute kidney injury


Code(s): N17.9 - ACUTE KIDNEY FAILURE, UNSPECIFIED   Status: Acute   Comment: 

resolving   





- Plan





* steroids discontinued


* will start pt's home meds


* pt seen by surgery no urgent cholecystectomy


* will transfer out of icu 


* possible discharge in am.


* will discontinue turcios





Review of Systems





- Review of Systems


ENT: negative: Ear Pain, Ear Discharge, Nose Pain, Nose Discharge, Nose 

Congestion, Mouth Pain, Mouth Swelling, Throat Pain, Throat Swelling, Other


Respiratory: negative: Cough, Dry, Shortness of Breath, Hemoptysis, SOB with 

Excertion, Pleuritic Pain, Sputum, Wheezing


Cardiovascular: negative: chest pain, palpitations, orthopnea, paroxysmal 

nocturnal dyspnea, edema, light headedness, other


Gastrointestinal: negative: Nausea, Vomiting, Abdominal Pain, Diarrhea, 

Constipation, Melena, Hematochezia, Other





- Medications/Allergies


Allergies/Adverse Reactions: 


 Allergies











Allergy/AdvReac Type Severity Reaction Status Date / Time


 


Penicillins Allergy Severe chest Verified 09/02/17 18:49





   cramps,  





   "massive"  











Medications: 


 Current Medications





Acetaminophen (Tylenol)  650 mg PO Q4H PRN


   PRN Reason: Headache/Fever or Pain


   Last Admin: 06/12/18 23:09 Dose:  650 mg


Acetaminophen/Codeine Phosphate (Tylenol #3)  1 tab PO TID PRN


   PRN Reason: Pain


Albuterol/Ipratropium (Duoneb)  3 ml NEB Q4H PRN


   PRN Reason: .SOB


   Last Admin: 06/09/18 20:16 Dose:  3 ml


Alprazolam (Xanax)  0.5 mg PO HSPRN PRN


   PRN Reason: Anxiety/Insomnia


   Last Admin: 06/12/18 23:09 Dose:  0.5 mg


Amlodipine Besylate (Norvasc)  5 mg PO DAILY [facility]


Atorvastatin Calcium (Lipitor)  10 mg PO HS [facility]


   Last Admin: 06/12/18 20:31 Dose:  10 mg


Diphenhydramine HCl (Benadryl)  25 mg PO HS PRN


   PRN Reason: Insomnia


Ferrous Sulfate (Feosol)  325 mg PO HS [facility]


   Last Admin: 06/12/18 20:31 Dose:  325 mg


Lisinopril/HCTZ (Prinizide 20-12.5)  1 tab PO BID [facility]


   Last Admin: 06/12/18 20:30 Dose:  1 tab


Ceftriaxone Sodium 2 gm/ (Sodium Chloride)  100 mls @ 200 mls/hr IVPB DAILY [facility]


   Last Admin: 06/12/18 08:54 Dose:  100 mls


Metoprolol Succinate (Toprol Xl)  50 mg PO BID [facility]


   Last Admin: 06/12/18 20:31 Dose:  50 mg


Ondansetron HCl (Zofran Odt)  4 mg PO Q6H PRN


   PRN Reason: Nausea/Vomiting


   Last Admin: 06/09/18 20:17 Dose:  4 mg


Ondansetron HCl (Zofran)  4 mg IVP Q6H PRN


   PRN Reason: Nausea/Vomiting


Pantoprazole Sodium (Protonix)  40 mg IVP Q12HR [facility]


   Last Admin: 06/12/18 20:32 Dose:  40 mg


Quetiapine Fumarate (Seroquel)  50 mg PO HS [facility]


   Last Admin: 06/12/18 20:31 Dose:  50 mg


Ranolazine (Ranexa)  500 mg PO BID [facility]


   Last Admin: 06/12/18 20:31 Dose:  500 mg


Sodium Chloride (Flush - Normal Saline)  10 ml IVF Q12HR [facility]


   Last Admin: 06/12/18 20:32 Dose:  10 ml


Sodium Chloride (Flush - Normal Saline)  10 ml IVF PRN PRN


   PRN Reason: Saline Flush


Tramadol HCl (Ultram)  50 mg PO Q4H PRN


   PRN Reason: Moderate Pain (4-6)


   Last Admin: 06/12/18 23:09 Dose:  50 mg


Venlafaxine HCl (Effexor Xr)  150 mg PO 0600,1200 [facility]


   Last Admin: 06/13/18 06:10 Dose:  150 mg

## 2018-06-13 NOTE — PRG
DATE OF SERVICE:  06/13/2018

 

This morning he is better.  He said he is having difficulty handling his secretions. 

 

PHYSICAL EXAMINATION: 

VITAL SIGNS:  Sats are 91% on room air, temperature is 97, blood pressure 144/94.

CHEST:  Chest reveals decreased breath sounds without any wheezing.

CARDIAC:  Normal S1, S2.  No gallops.

ABDOMEN:  Soft, no masses.

 

IMPRESSION:

1.  Escherichia coli sepsis, probably secondary to urinary tract infection.

2.  Morbid obesity. 

3.  Sleep apnea.

4.  Bronchitis.

 

PLAN:  Continue antibiotics.  Probably can be switched over to p.o. antibiotics.  Infectious Disease 
has been consulted.  Outpatient sleep study.  The patient is requesting neb treatments which has been
 arranged.

## 2018-06-13 NOTE — PQF
GIOVANNI POON NAI FENG

[id number]                                                             [id number]

[id number]                             

                                   

CLINICAL DOCUMENTATION IMPROVEMENT CLARIFICATION FORM:  ICD-10 Updated



PLEASE DO AN ADDENDUM TO THE PROGRESS NOTE WITH ANY DOCUMENTATION UPDATES OR 
ADDITIONS AND CARRY THROUGH TO DC SUMMARY.   THANK YOU.



DATE:   6-13-18                                    ATTN:  DR. TURNER



Please exercise your independent, professional judgment in responding to the 
clarification form. 

Clinical indicators are provided on the bottom of this form for your review





Please check appropriate box(s):



[  ] Hypovolemic Shock     

[  x] Septic Shock  

[  ] Shock Unspecified

[  ] Other diagnosis ___________

[  ] Unable to determine



In addition, please specify:

Present on Admission (POA):  [ x ] Yes             [  ] No             [  ] 
Unable to determine



For continuity of documentation, please document condition throughout progress 
notes and discharge summary.  Thank You.





CLINICAL INDICATORS - SIGNS / SYMPTOMS / LABS



ED:  BP 85/55 ; 75/49 ; 78/55; 89/54; 85/57; 79/54

        PULSE 99- 106



LABS:  

6-9  LACTIC ACIDOSIS    4.4

       GFR                           27

       BUN                          42

       CREAT                     2.45

       HH                            12.4







RISK FACTORS



ED:  SEPSIS W/ DEHYDRATION

H&P:  SEPSIS W/ POSSIBLE URINE SOURCE

      



TREATMENTS:

ED:  4 LITERS FLUID IN ED

ICU MONITORING



MAR:  ROCEPHIN  6-10/ 6-11/6-12/ 6-13

           MERREM  6-10/ 6-11

           LEVOPHED  6-10

           VASOPRESSIN  6-10



THANK YOU,

ANA



(This form is maintained as a part of the permanent medical record)

 2015 [employer]  All Rights Reserved

Ana Stevens RN, BS    [email or web address]    Cell Phone:  [phone]

                                                              

 

[facility] Center[facility]KKI

## 2018-06-13 NOTE — PDOC.PN
- Subjective


Encounter Start Date: 06/13/18


Encounter Start Time: 09:45


Subjective: pt states that he has been coughing pink tinged sputum





- Objective


Resuscitation Status: 


 











Resuscitation Status           FULL:Full Resuscitation














Vital Signs & Weight: 


 Vital Signs (12 hours)











  Temp Pulse Resp BP BP Pulse Ox


 


 06/13/18 11:14  97.8 F  66  20   145/87 H  97


 


 06/13/18 11:08   68  18    98


 


 06/13/18 09:19   63    


 


 06/13/18 09:18   63   144/94 H  


 


 06/13/18 08:00  97.5 F L  63  20    99


 


 06/13/18 07:26  97.5 F L  63  20   144/94 H  99


 


 06/13/18 04:00  98.1 F  88  20    96








 Weight











Admit Weight                   262 lb


 


Weight                         262 lb











 Most Recent Monitor Data











Heart Rate from ECG            85


 


NIBP                           159/92


 


NIBP BP-Mean                   115


 


Respiration from ECG           20


 


SpO2                           100














I&O: 


 











 06/12/18 06/13/18 06/14/18





 06:59 06:59 06:59


 


Intake Total 1510 1399 


 


Output Total 3710 665 


 


Balance -2200 734 











Result Diagrams: 


 06/13/18 12:22





 06/12/18 03:50





Phys Exam





- Physical Examination


HEENT: PERRLA, moist MMs, sclera anicteric, TM's clear, oral pharynx no lesions

, 2+ tonsils


Neck: no nodes, no JVD, supple, full ROM


Respiratory: no wheezing, no rales, no rhonchi, wheezing present, clear to 

auscultation bilateral


mild crackles to bases


Gastrointestinal: soft, non-tender, no distention, positive bowel sounds


Musculoskeletal: no edema, pulses present, edema present





Dx/Plan


(1) Sepsis


Code(s): A41.9 - SEPSIS, UNSPECIFIED ORGANISM   Status: Acute   





(2) UTI (urinary tract infection)


Status: Acute   





(3) Leukocytosis


Code(s): D72.829 - ELEVATED WHITE BLOOD CELL COUNT, UNSPECIFIED   Status: Acute

   





(4) Acute kidney injury


Code(s): N17.9 - ACUTE KIDNEY FAILURE, UNSPECIFIED   Status: Acute   Comment: 

resolving   





(5) Bacteremia


Code(s): R78.81 - BACTEREMIA   Status: Acute   





- Plan





* blood cx positive for ecoli. will consult ID. Pt complains of cough with pink 

tinged sputum. will give one dose of lasix and check cxr. will get PT to see 

pt. 


creatinine improving. 


pt possible could even have prostatitis? 





Review of Systems





- Review of Systems


ENT: negative: Ear Pain, Ear Discharge, Nose Pain, Nose Discharge, Nose 

Congestion, Mouth Pain, Mouth Swelling, Throat Pain, Throat Swelling, Other


Respiratory: Cough


Cardiovascular: negative: chest pain, palpitations, orthopnea, paroxysmal 

nocturnal dyspnea, edema, light headedness, other


Gastrointestinal: negative: Nausea, Vomiting, Abdominal Pain, Diarrhea, 

Constipation, Melena, Hematochezia, Other





- Medications/Allergies


Allergies/Adverse Reactions: 


 Allergies











Allergy/AdvReac Type Severity Reaction Status Date / Time


 


Penicillins Allergy Severe chest Verified 09/02/17 18:49





   cramps,  





   "massive"  











Medications: 


 Current Medications





Acetaminophen (Tylenol)  650 mg PO Q4H PRN


   PRN Reason: Headache/Fever or Pain


   Last Admin: 06/12/18 23:09 Dose:  650 mg


Acetaminophen/Codeine Phosphate (Tylenol #3)  1 tab PO TID PRN


   PRN Reason: Pain


Albuterol/Ipratropium (Duoneb)  3 ml NEB Q4H PRN


   PRN Reason: .SOB


   Last Admin: 06/09/18 20:16 Dose:  3 ml


Albuterol/Ipratropium (Duoneb)  3 ml NEB TID-RT [facility]


   Last Admin: 06/13/18 11:08 Dose:  3 ml


Alprazolam (Xanax)  0.5 mg PO HSPRN PRN


   PRN Reason: Anxiety/Insomnia


   Last Admin: 06/12/18 23:09 Dose:  0.5 mg


Amlodipine Besylate (Norvasc)  5 mg PO DAILY [facility]


   Last Admin: 06/13/18 09:18 Dose:  5 mg


Atorvastatin Calcium (Lipitor)  10 mg PO HS [facility]


   Last Admin: 06/12/18 20:31 Dose:  10 mg


Diphenhydramine HCl (Benadryl)  25 mg PO HS PRN


   PRN Reason: Insomnia


Ferrous Sulfate (Feosol)  325 mg PO HS [facility]


   Last Admin: 06/12/18 20:31 Dose:  325 mg


Lisinopril/HCTZ (Prinizide 20-12.5)  1 tab PO BID [facility]


   Last Admin: 06/13/18 09:19 Dose:  1 tab


Ceftriaxone Sodium 2 gm/ (Sodium Chloride)  100 mls @ 200 mls/hr IVPB DAILY [facility]


   Last Admin: 06/13/18 09:18 Dose:  100 mls


Metoprolol Succinate (Toprol Xl)  50 mg PO BID [facility]


   Last Admin: 06/13/18 09:19 Dose:  50 mg


Ondansetron HCl (Zofran Odt)  4 mg PO Q6H PRN


   PRN Reason: Nausea/Vomiting


   Last Admin: 06/09/18 20:17 Dose:  4 mg


Ondansetron HCl (Zofran)  4 mg IVP Q6H PRN


   PRN Reason: Nausea/Vomiting


Pantoprazole Sodium (Protonix)  40 mg PO Q12HR [facility]


Quetiapine Fumarate (Seroquel)  25 mg PO HS [facility]


Ranolazine (Ranexa)  500 mg PO BID [facility]


   Last Admin: 06/13/18 09:20 Dose:  500 mg


Sodium Chloride (Flush - Normal Saline)  10 ml IVF Q12HR [facility]


   Last Admin: 06/13/18 09:20 Dose:  10 ml


Sodium Chloride (Flush - Normal Saline)  10 ml IVF PRN PRN


   PRN Reason: Saline Flush


Tramadol HCl (Ultram)  50 mg PO Q4H PRN


   PRN Reason: Moderate Pain (4-6)


   Last Admin: 06/12/18 23:09 Dose:  50 mg


Venlafaxine HCl (Effexor Xr)  150 mg PO 0600,1200 [facility]


   Last Admin: 06/13/18 11:50 Dose:  150 mg

## 2018-06-13 NOTE — RAD
PA AND LATERAL CHEST:

 

COMPARISON: 

6/9/18 study.

 

HISTORY: 

Shortness of breath.

 

FINDINGS: 

Heart size is enlarged.  Pulmonary vessels are mildly engorged.  There are some increased interstitia
l lung markings.  The overall appearance is fairly similar to the previous examination.

 

IMPRESSION: 

Cardiomegaly with pulmonary vascular engorgement and some increased interstitial markings, some of wh
ich may represent chronic change would suggest some element of edema.  Findings are fairly similar to
 the previous exam given differences in technique.  Some of the interstitial changes may be slightly 
accentuated on today's study.

 

POS: [facility]

## 2018-06-13 NOTE — CON
DATE OF CONSULTATION:  06/13/2018

 

REASON FOR CONSULTATION:  Invasive urinary tract infection.

 

HISTORY OF PRESENT ILLNESS:  A 62-year-old whom I had treated in the past for knee infection, bactere
keaton, and lumbosacral spine infection in 2016.  In 09/2017, he was admitted with upper GI bleed from g
astrointestinal ulcers, and at this time, he presented with new onset of vomiting associated with stephy
e urinary symptoms which had been present for a few weeks before, has had some hematuria as well asso
ciated with this.  Had subjective fever and chills.  His initial BP 85/50, heart rate 106, temperatur
e 99.4.  The exam showed clear lungs.  Normal heart.  Abdomen distended but not tender.

 

LABORATORY DATA:  White cell count 6.6, hemoglobin 12.4, creatinine 2.4.  Lactic acid 4.4, BUN 42%, 4
1% bands.  Ultrasound of the gallbladder showed a little bit of distention.  There was some concern w
ith that and HIDA scan was done, which showed just a decreased ejection fraction and now we have a po
sitive blood culture from urine and blood with E. coli with a very broad susceptibility profile.

 

He is feeling better.  No headaches.  No visual symptoms, sore throat, odynophagia, or dysphagia.  No
 cough or sputum production.  No chest pain, abdominal pain, or diarrhea.  No back pain.  Still some 
delay in voiding stream.  No joint symptoms.  No neurological symptoms.

 

PAST MEDICAL HISTORY:  Hypertension, hyperlipidemia, low back pain, right knee septic arthritis secon
luba to methicillin-sensitive Staph aureus, treated in the past with IV antimicrobial therapy, bacter
emia, and bilateral hip surgeries.

 

ALLERGIES:  PENICILLIN with rash.

 

CURRENT MEDICATIONS:  Tylenol, DuoNeb, Xanax, Norvasc, Lipitor, Rocephin, Benadryl, Feosol, Lasix, Pr
inzide, Toprol, Zofran, Protonix, Seroquel, Ranexa, Ultram, Effexor.

 

FAMILY HISTORY:  Heart disease.

 

SOCIAL HISTORY:  Never a smoker.  Lives in [place].  Retired.

 

PHYSICAL EXAMINATION:

GENERAL:  Pleasant, in no distress.

VITAL SIGNS:  T-max 101 on arrival.  He has been afebrile since.  /90, pulse 63, respirations 2
0, O2 sat 99%.

SKIN:  Normal.  Peripheral IV access.  No Tillman catheter.

HEENT:  Noncontributory.

NECK:  Supple.

LUNGS:  Symmetric clear breath sounds.

CARDIOVASCULAR:  S1, S2, regular rate.  No murmurs.

ABDOMEN:  Soft and not distended or tender.  Rectal examination showed a slightly enlarged prostate g
land, but no nodularity noted.

GENITAL:  Normal.

EXTREMITIES:  No joint inflammatory activity.  Pulses 1+ in dorsalis pedis.  Moves all extremities eq
ually.  Plantar responses are flexure.  No clonus.

NEUROLOGIC:  Cognitive function appears to be intact.

 

LABORATORY AND X-RAY FINDINGS:  White cell count is down from 20-14.6 hemoglobin is at 12.1, platelet
s are stable at 159, bands are down to 3%, creatinine is at 1.52.  Urinalysis was greater than 50 on 
admission, greater than 50 wbc's on admission.  Alkaline phosphatase now has normalized at 93.  Repor
ts include a chest x-ray with cardiomegaly, pulmonary vascular engorgement.  The abdomen ultrasound w
ith distention of gallbladder, kidneys are not obstructed and urinary bladder decompressed.

 

ASSESSMENT:  Hypertension and prior episode of methicillin-sensitive Staphylococcus aureus right knee
 infection, now with evidence of invasive urinary tract infection, likely pyelonephritis.

 

DISCUSSION:  There is no evidence of obstruction.  The organism is susceptible to quinolones and I wo
uld recommend transition to ciprofloxacin, treat for approximately 2-4 weeks.  The patient will need 
PSA and likely Flomax.

## 2018-06-14 VITALS — SYSTOLIC BLOOD PRESSURE: 120 MMHG | TEMPERATURE: 97.7 F | DIASTOLIC BLOOD PRESSURE: 80 MMHG

## 2018-06-14 LAB
ANION GAP SERPL CALC-SCNC: 13 MMOL/L (ref 10–20)
BUN SERPL-MCNC: 28 MG/DL (ref 8.4–25.7)
CALCIUM SERPL-MCNC: 9.4 MG/DL (ref 7.8–10.44)
CHLORIDE SERPL-SCNC: 99 MMOL/L (ref 98–107)
CO2 SERPL-SCNC: 29 MMOL/L (ref 23–31)
CREAT CL PREDICTED SERPL C-G-VRATE: 96 ML/MIN (ref 70–130)
GLUCOSE SERPL-MCNC: 138 MG/DL (ref 80–115)
HGB BLD-MCNC: 11.4 G/DL (ref 14–18)
MCH RBC QN AUTO: 29.9 PG (ref 27–31)
MCV RBC AUTO: 88.8 FL (ref 80–94)
MDIFF COMPLETE?: YES
PLATELET # BLD AUTO: 160 THOU/UL (ref 130–400)
PLATELET BLD QL SMEAR: (no result)
POTASSIUM SERPL-SCNC: 3.3 MMOL/L (ref 3.5–5.1)
RBC # BLD AUTO: 3.81 MILL/UL (ref 4.7–6.1)
SODIUM SERPL-SCNC: 138 MMOL/L (ref 136–145)
WBC # BLD AUTO: 10.9 THOU/UL (ref 4.8–10.8)

## 2018-06-14 RX ADMIN — LISINOPRIL AND HYDROCHLOROTHIAZIDE SCH TAB: 12.5; 2 TABLET ORAL at 09:29

## 2018-06-14 RX ADMIN — CEFTRIAXONE SCH MLS: 2 INJECTION, POWDER, FOR SOLUTION INTRAMUSCULAR; INTRAVENOUS at 09:57

## 2018-06-14 RX ADMIN — Medication SCH ML: at 09:30

## 2018-06-14 NOTE — PRG
DATE OF SERVICE:  06/14/2018

 

SUBJECTIVE:  This morning, awake, alert and responsive.  No cough, no shortness of breath.

 

OBJECTIVE:

VITAL SIGNS:  Temperature 98, pulse 58, blood pressure 130/79.

CHEST:  Chest revealed decreased breath sounds, no wheezing.

CARDIAC:  Normal S1, S2.  No gallops.

ABDOMEN:  Soft, no masses.

 

IMPRESSION:

1.  E. coli sepsis.

2.  Morbid obesity.

3.  Hypertension, relatively resolved.

4.  Sleep apnea.

 

PLAN:  The patient can be discharged home as per primary care physician.  Pulmonary will follow on ou
tpatient basis with a sleep study.

## 2018-06-15 NOTE — DIS
DATE OF ADMISSION:  06/09/2018

 

DATE OF DISCHARGE:  06/14/2018

 

DISCHARGE DIAGNOSES:

1.  Urinary tract infection, sepsis.

2.  Bacteremia.

3.  Kidney injury.

 

HOSPITAL COURSE:  The patient is a very pleasant 62-year-old male who presented to the hospital with 
complaints of nausea, vomiting, diarrhea, and feeling weak.  The patient was treated for sepsis radha
col and was found to have a UTI.  Patient initially was admitted in the ICU, was put on 2 pressors an
d continued to be hypotensive after adequate fluid resuscitation.  The patient was found to have E. c
samantha UTI and also his blood cultures x2 bottles were positive for E. coli.  The patient at this time a
lso had some complaints of right upper quadrant that was concern for possible cholecystitis.  Patient
 did undergo a HIDA scan, which did indicate that the patient had a low ejection fraction and also ha
d a chronic acalculous cholecystitis and gallbladder dyskinesia.  Surgery was consulted who recommend
ed outpatient followup.  The patient continued to improve throughout the hospital stay.  He was also 
seen by Infectious Disease.  The patient will be discharged home.  Follow up with PCP and also with a
 possible Urology since patient states that he has been having some trouble urinating.

 

DISCHARGE MEDICATIONS:  Omnicef 300 mg p.o. b.i.d., Norvasc 5 mg daily, Zocor 20 mg at bedtime, fenof
ibrate 145 q.p.m., metoprolol 50 mg b.i.d., Protonix 40 mg b.i.d., Seroquel 50 mg at bedtime, Lisinop
ril/hydrochlorothiazide 1 p.o. b.i.d., Ranexa 500 mg b.i.d., Effexor 150 mg b.i.d., iron 325 at bedti
me, multivitamin 1 daily.  The patient initially was supposed to be on Cipro; however, given his cont
raindication with Seroquel, Omnicef was chosen.

 

DISCHARGE INSTRUCTIONS:  Again, the patient will be discharged home.  Follow up with PCP.

## 2018-07-13 ENCOUNTER — HOSPITAL ENCOUNTER (OUTPATIENT)
Dept: HOSPITAL 92 - SDC | Age: 63
Discharge: HOME | End: 2018-07-13
Attending: SURGERY
Payer: COMMERCIAL

## 2018-07-13 VITALS — BODY MASS INDEX: 32.3 KG/M2

## 2018-07-13 DIAGNOSIS — Z79.899: ICD-10-CM

## 2018-07-13 DIAGNOSIS — E66.01: ICD-10-CM

## 2018-07-13 DIAGNOSIS — N18.9: ICD-10-CM

## 2018-07-13 DIAGNOSIS — Z88.0: ICD-10-CM

## 2018-07-13 DIAGNOSIS — K81.1: Primary | ICD-10-CM

## 2018-07-13 DIAGNOSIS — K76.0: ICD-10-CM

## 2018-07-13 PROCEDURE — 88304 TISSUE EXAM BY PATHOLOGIST: CPT

## 2018-07-13 PROCEDURE — 51798 US URINE CAPACITY MEASURE: CPT

## 2018-07-13 PROCEDURE — 96374 THER/PROPH/DIAG INJ IV PUSH: CPT

## 2018-07-13 PROCEDURE — 0FT44ZZ RESECTION OF GALLBLADDER, PERCUTANEOUS ENDOSCOPIC APPROACH: ICD-10-PCS | Performed by: SURGERY

## 2018-07-13 NOTE — OP
DATE OF PROCEDURE:  07/13/2018

 

PREOPERATIVE DIAGNOSES:  Chronic cholecystitis, acalculous, biliary dyskinesia, morbid obesity.

 

POSTOPERATIVE DIAGNOSES:  Chronic cholecystitis, acalculous, biliary dyskinesia, morbid obesity.

 

PROCEDURE:  Laparoscopic video cholecystectomy.

 

SURGEON:  Ahsan Lawler M.D.

 

ANESTHESIA:  General.  Local 0.5% Marcaine with epinephrine 30 mL, 30 mL volume used.

 

SPECIMENS:  Gallbladder.

 

FINDINGS:  Fatty liver, morbid obesity, difficult cholecystectomy.

 

PROCEDURE:  The patient was taken to the operating room where under general anesthesia, abdomen was p
repared with ChloraPrep, draped in routine fashion.  Local anesthetic infiltrated into the skin and s
ubcutaneous tissue about each port site.  Infraumbilical incision made and pneumoperitoneum to 15 mmH
g obtained, replacing with a 5 port and video laparoscope inserted.  Right subxiphoid incision made a
nd 11 port placed.  Right subcostal incision made mid clavicular anterior axillary lines and 5 ports 
placed.  Morbid obesity made this operation difficult.  Fundus of gallbladder grasped and reflected c
ephalad.  Infundibulum grasped and reflected laterally.  Cystic artery and duct dissected free.  Crit
ical view obtained.  Cystic artery and duct doubly clipped proximally, divided, and gallbladder disse
cted free from the liver bed obtaining good hemostasis prior to division of final peritoneal attachme
nts.  Good hemostasis ensured with the cautery.  Irrigant and pneumoperitoneum evacuated.  Gallbladde
r removed and submitted to pathology.  All instruments removed and all skin incisions approximated wi
th interrupted subdermal 4-0 Monocryl and DermaGlue applied.

## 2018-07-23 NOTE — HP
HISTORY OF PRESENT ILLNESS:  José Luis Madison is seen in the hospital 06/11/2018 regarding abnorma
l HIDA scan.  At that time, he denied any symptoms.  He now reports that he is having nausea, bloatin
g, early satiety and desires cholecystectomy.  His gallbladder ejection fraction was 23%.  The plan i
s for a laparoscopic cholecystectomy.  

 

ALLERGIES:  The patient is allergic to PENICILLIN.

 

SOCIAL HISTORY:  He does not smoke. 

 

PAST MEDICAL HISTORY:  He does have chronic kidney disease and a fatty liver.  Common bile duct was 6
 mm on ultrasound.  

 

The plan at this time is for laparoscopic cholecystectomy with cholangiograms.  The risk of operation
 including infection, bleeding, visceral injury were discussed.

 

PHYSICAL EXAMINATION: 

LUNGS:  Clear to auscultation.

CARDIAC:  Regular rate and rhythm without murmur or gallop.

ABDOMEN:  Soft, nontender.

EXTREMITIES:  Unremarkable.

 

ASSESSMENT AND PLAN:  Biliary dyskinesia with slight elevation of liver function test.

 

PLAN:  

1.  Laparoscopic cholecystectomy, cholangiograms.  

2.  Chronic kidney disease.

## 2018-07-25 ENCOUNTER — HOSPITAL ENCOUNTER (OUTPATIENT)
Dept: HOSPITAL 92 - SLEEPLAB | Age: 63
Discharge: HOME | End: 2018-07-25
Attending: INTERNAL MEDICINE
Payer: COMMERCIAL

## 2018-07-25 DIAGNOSIS — G47.33: Primary | ICD-10-CM

## 2018-07-25 DIAGNOSIS — E66.9: ICD-10-CM

## 2018-07-25 DIAGNOSIS — R53.83: ICD-10-CM

## 2018-07-25 DIAGNOSIS — F41.8: ICD-10-CM

## 2018-07-25 DIAGNOSIS — I10: ICD-10-CM

## 2018-07-25 PROCEDURE — 95810 POLYSOM 6/> YRS 4/> PARAM: CPT

## 2018-08-07 ENCOUNTER — HOSPITAL ENCOUNTER (OUTPATIENT)
Age: 63
Discharge: HOME | End: 2018-08-07
Payer: COMMERCIAL

## 2018-08-07 DIAGNOSIS — R53.83: ICD-10-CM

## 2018-08-07 DIAGNOSIS — E66.9: ICD-10-CM

## 2018-08-07 DIAGNOSIS — G47.33: Primary | ICD-10-CM

## 2019-03-05 ENCOUNTER — HOSPITAL ENCOUNTER (OUTPATIENT)
Dept: HOSPITAL 92 - SDC | Age: 64
Discharge: HOME | End: 2019-03-05
Attending: UROLOGY
Payer: COMMERCIAL

## 2019-03-05 VITALS — BODY MASS INDEX: 29.4 KG/M2

## 2019-03-05 DIAGNOSIS — N47.1: ICD-10-CM

## 2019-03-05 DIAGNOSIS — Z87.891: ICD-10-CM

## 2019-03-05 DIAGNOSIS — N35.912: Primary | ICD-10-CM

## 2019-03-05 DIAGNOSIS — K21.9: ICD-10-CM

## 2019-03-05 DIAGNOSIS — I10: ICD-10-CM

## 2019-03-05 DIAGNOSIS — N52.9: ICD-10-CM

## 2019-03-05 DIAGNOSIS — Z88.0: ICD-10-CM

## 2019-03-05 DIAGNOSIS — F41.8: ICD-10-CM

## 2019-03-05 DIAGNOSIS — Z79.899: ICD-10-CM

## 2019-03-05 DIAGNOSIS — E78.00: ICD-10-CM

## 2019-03-05 PROCEDURE — 0TND8ZZ RELEASE URETHRA, VIA NATURAL OR ARTIFICIAL OPENING ENDOSCOPIC: ICD-10-PCS | Performed by: UROLOGY

## 2019-03-05 NOTE — OP
DATE OF PROCEDURE:  03/05/2019



PREOPERATIVE DIAGNOSIS:  Urethral stricture.



POSTOPERATIVE DIAGNOSIS:  Urethral stricture.



PROCEDURE PERFORMED:  Optical internal urethrotomy, leaving a 20-Comoran silicone

[place] catheter. 



COMPLICATIONS:  None.



DRAIN REMAINING:  A 20-Comoran catheter.



ESTIMATED BLOOD LOSS:  Minimal.



INDICATIONS FOR PROCEDURE:  The patient is a 63-year-old male, who was followed in

the office for lower urinary tract symptoms and ultimately diagnosed with a urethral

stricture and opted for optical internal urethrotomy in the OR. 



DESCRIPTION OF PROCEDURE:  The patient was brought into the room by Anesthesia, laid

on the table in supine position.  After receiving general anesthetic, he was placed

in lithotomy position and his perineum was prepped and draped in sterile fashion.

Using a 21-Comoran cystoscope with a portion accepting a resectoscope knife went to

the level of the stricture, which was in the bulbar urethra and at the 12 o'clock

position, I incised until I could get my scope in and through to the bladder.  The

prostate itself was not significantly hypertrophied.  The ureteral orifices were

normal in position.  There was mild mucus debris, but otherwise no concerns for

stones or lesions.  After inspecting the entire bladder, the bladder was then

further rinsed and filled and then the 21-Comoran cystoscope with a resectoscope

instrument was removed.  Prior to placing that originally, a 17-Comoran cystoscope

had been placed to the level of the stricture and a wire had been placed in there,

so that was remaining there as a safety before placing the 21-Comoran.  So when I

removed the 21-Comoran, the wire was still in place and this 

was used to help place a 21-Comoran silicone catheter, which had been turned into a

[place].  This was blown up and left to gravity and the wire was removed and then 

secured to a leg bag.  The patient tolerated the procedure well, was then awakened

and transferred to the PACU in stable condition. 







Job ID:  [id number]

## 2020-10-20 ENCOUNTER — HOSPITAL ENCOUNTER (EMERGENCY)
Dept: HOSPITAL 57 - BURERS | Age: 65
Discharge: ANOTHER HOSPITAL | End: 2020-10-20
Payer: COMMERCIAL

## 2020-10-20 DIAGNOSIS — F43.10: ICD-10-CM

## 2020-10-20 DIAGNOSIS — J96.01: Primary | ICD-10-CM

## 2020-10-20 DIAGNOSIS — Z87.891: ICD-10-CM

## 2020-10-20 DIAGNOSIS — F41.9: ICD-10-CM

## 2020-10-20 DIAGNOSIS — I10: ICD-10-CM

## 2020-10-20 DIAGNOSIS — N17.9: ICD-10-CM

## 2020-10-20 LAB
ALBUMIN SERPL BCG-MCNC: 4.1 G/DL (ref 3.4–4.8)
ALP SERPL-CCNC: 53 U/L (ref 40–110)
ALT SERPL W P-5'-P-CCNC: 34 U/L (ref 8–55)
ANION GAP SERPL CALC-SCNC: 22 MMOL/L (ref 10–20)
ANISOCYTOSIS BLD QL SMEAR: (no result) (100X)
AST SERPL-CCNC: 79 U/L (ref 5–34)
BILIRUB SERPL-MCNC: 0.5 MG/DL (ref 0.2–1.2)
BUN SERPL-MCNC: 61 MG/DL (ref 8.4–25.7)
CALCIUM SERPL-MCNC: 8.8 MG/DL (ref 7.8–10.44)
CHLORIDE SERPL-SCNC: 103 MMOL/L (ref 98–107)
CK SERPL-CCNC: 124 U/L (ref 30–200)
CO2 SERPL-SCNC: 16 MMOL/L (ref 23–31)
CREAT CL PREDICTED SERPL C-G-VRATE: 0 ML/MIN (ref 70–130)
GLOBULIN SER CALC-MCNC: 3.9 G/DL (ref 2.4–3.5)
GLUCOSE SERPL-MCNC: 66 MG/DL (ref 80–115)
HGB BLD-MCNC: 12.8 G/DL (ref 14–18)
MCH RBC QN AUTO: 24.3 PG (ref 27–31)
MCV RBC AUTO: 79.3 FL (ref 78–98)
MDIFF COMPLETE?: YES
MICROCYTES BLD QL SMEAR: (no result) (100X)
PLATELET # BLD AUTO: 149 THOU/UL (ref 130–400)
POIKILOCYTOSIS BLD QL SMEAR: (no result) (100X)
POTASSIUM SERPL-SCNC: 3.1 MMOL/L (ref 3.5–5.1)
RBC # BLD AUTO: 5.26 MILL/UL (ref 4.7–6.1)
SODIUM SERPL-SCNC: 138 MMOL/L (ref 136–145)
WBC # BLD AUTO: 6.2 THOU/UL (ref 4.8–10.8)

## 2020-10-20 PROCEDURE — 36415 COLL VENOUS BLD VENIPUNCTURE: CPT

## 2020-10-20 PROCEDURE — 83605 ASSAY OF LACTIC ACID: CPT

## 2020-10-20 PROCEDURE — 82550 ASSAY OF CK (CPK): CPT

## 2020-10-20 PROCEDURE — 96375 TX/PRO/DX INJ NEW DRUG ADDON: CPT

## 2020-10-20 PROCEDURE — 83880 ASSAY OF NATRIURETIC PEPTIDE: CPT

## 2020-10-20 PROCEDURE — 96367 TX/PROPH/DG ADDL SEQ IV INF: CPT

## 2020-10-20 PROCEDURE — 87804 INFLUENZA ASSAY W/OPTIC: CPT

## 2020-10-20 PROCEDURE — 96365 THER/PROPH/DIAG IV INF INIT: CPT

## 2020-10-20 PROCEDURE — 80053 COMPREHEN METABOLIC PANEL: CPT

## 2020-10-20 PROCEDURE — 84484 ASSAY OF TROPONIN QUANT: CPT

## 2020-10-20 PROCEDURE — 85025 COMPLETE CBC W/AUTO DIFF WBC: CPT

## 2020-10-20 PROCEDURE — 71045 X-RAY EXAM CHEST 1 VIEW: CPT

## 2020-10-20 PROCEDURE — 87040 BLOOD CULTURE FOR BACTERIA: CPT

## 2020-10-20 NOTE — RAD
PORTABLE CHEST

10/20/20

 

An AP portable film at 1534 is compared with a 6/13/18 study. 

 

The heart is mildly enlarged as before but no more so. There are diffuse patchy infiltrates present b
ilaterally. Most extensive is in the right upper lobe but there are also some patchy ground glass per
ipheral areas in the left mid lung zone. The vessels themselves do not really appear congested. This 
seems more likely air space disease than vascular in nature. Pneumonia, particularly COVID, should be
 considered. There are no large effusions. 

 

IMPRESSION: 

Patchy air space disease, worst in the right upper lobe. While bacterial etiologies are possible, vir
al seems more likely and COVID should be a consideration. 

 

Findings discussed with Dr. Stevens at 1622 on 10/20/20. 

 

POS: HOME